# Patient Record
Sex: MALE | Race: WHITE
[De-identification: names, ages, dates, MRNs, and addresses within clinical notes are randomized per-mention and may not be internally consistent; named-entity substitution may affect disease eponyms.]

---

## 2019-03-29 ENCOUNTER — HOSPITAL ENCOUNTER (OUTPATIENT)
Dept: HOSPITAL 92 - SDC | Age: 19
Discharge: HOME | End: 2019-03-29
Attending: SURGERY
Payer: COMMERCIAL

## 2019-03-29 VITALS — BODY MASS INDEX: 21.6 KG/M2

## 2019-03-29 DIAGNOSIS — Z88.2: ICD-10-CM

## 2019-03-29 DIAGNOSIS — Z88.0: ICD-10-CM

## 2019-03-29 DIAGNOSIS — L05.91: Primary | ICD-10-CM

## 2019-03-29 PROCEDURE — 88304 TISSUE EXAM BY PATHOLOGIST: CPT

## 2019-03-29 PROCEDURE — 0JB90ZZ EXCISION OF BUTTOCK SUBCUTANEOUS TISSUE AND FASCIA, OPEN APPROACH: ICD-10-PCS | Performed by: SURGERY

## 2019-04-01 NOTE — OP
DATE OF PROCEDURE:  03/29/2019



PREOPERATIVE DIAGNOSIS:  Pilonidal cyst and disease.



POSTOPERATIVE DIAGNOSIS:  Pilonidal cyst and disease.



PROCEDURE PERFORMED:  Pilonidal cyst excision.



ANESTHESIA:  General.



ESTIMATED BLOOD LOSS:  Minimal.



COMPLICATIONS:  None.



SPECIMEN:  Gluteal cleft skin.



DESCRIPTION OF PROCEDURE:  The patient was taken to the operating room and laid

supine on the operating room table.  After general anesthetic was obtained, he was

placed in the prone position.  His perianal, gluteal cleft, low back, and buttocks

were all shaved, prepped, and draped in a sterile fashion.  An elliptical incision

was used to ellipse out the known cyst at the superior aspect of the gluteal cleft.

All the way to include the gluteal cleft itself with the multiple fistula tracts.

Dissection was taken down towards the sacrum.  The sacral bone is not exposed.  The

wound was irrigated.  Local anesthetic was applied.  The wound was closed in

multiple layers using Vicryl suture, deep and superficial, running 4-0 Monocryl, and

Dermabond.  The patient was sent to Recovery in stable condition.  All instrument

counts, needle counts, and lap counts were correct. 







Job ID:  [id number]